# Patient Record
Sex: MALE
[De-identification: names, ages, dates, MRNs, and addresses within clinical notes are randomized per-mention and may not be internally consistent; named-entity substitution may affect disease eponyms.]

---

## 2018-01-28 ENCOUNTER — HOSPITAL ENCOUNTER (EMERGENCY)
Dept: HOSPITAL 25 - UCCORT | Age: 68
Discharge: HOME | End: 2018-01-28
Payer: COMMERCIAL

## 2018-01-28 VITALS — DIASTOLIC BLOOD PRESSURE: 86 MMHG | SYSTOLIC BLOOD PRESSURE: 143 MMHG

## 2018-01-28 DIAGNOSIS — I10: ICD-10-CM

## 2018-01-28 DIAGNOSIS — J32.9: Primary | ICD-10-CM

## 2018-01-28 DIAGNOSIS — Z79.82: ICD-10-CM

## 2018-01-28 PROCEDURE — 99202 OFFICE O/P NEW SF 15 MIN: CPT

## 2018-01-28 PROCEDURE — G0463 HOSPITAL OUTPT CLINIC VISIT: HCPCS

## 2018-01-28 NOTE — UC
Throat Pain/Nasal Jase HPI





- HPI Summary


HPI Summary: 





Right sinus pain. He had uri about 5 days ago with chills and congestion. He 

felt better and then has had right sinus pain. He also had right upper molar 

dental trauma but there is no hot or cold sensitivity there. NO fever. 





- History of Current Complaint


Chief Complaint: UCRespiratory


Stated Complaint: SINUS


Time Seen by Provider: 01/28/18 09:07


Hx Obtained From: Patient


Onset/Duration: Gradual Onset, Lasting Days


Severity: Moderate


Pain Intensity: 5


Cough: None


Associated Signs & Symptoms: Positive: Sinus Discomfort.  Negative: Dysphagia, 

FB Sensation, Fever, Vomiting, Rash





- Allergies/Home Medications


Allergies/Adverse Reactions: 


 Allergies











Allergy/AdvReac Type Severity Reaction Status Date / Time


 


No Known Allergies Allergy   Verified 01/28/18 09:08











Home Medications: 


 Home Medications





Acetaminophen [Acetaminophen Extra Stren] 1,000 mg PO Q6H PRN 01/28/18 [History 

Confirmed 01/28/18]


Aspirin [Aspirin 81 MG TAB] 81 mg PO QPM 01/28/18 [History Confirmed 01/28/18]


Atorvastatin* [Lipitor*] 20 mg PO QPM 01/28/18 [History Confirmed 01/28/18]


Cholecalciferol [Vitamin D] 1,000 unit PO DAILY 01/28/18 [History Confirmed 01/ 28/18]


Irbesartan [Avapro] 150 mg PO QPM 01/28/18 [History Confirmed 01/28/18]


Multivitamins/Minerals TAB* [Theragran/minerals TAB*] 1 tab PO DAILY 01/28/18 [

History Confirmed 01/28/18]


Omega-3 Fatty Acids [Fish Oil] 2,400 mg PO DAILY 01/28/18 [History Confirmed 01/ 28/18]


Phenylephrine HCl (Oral) [Sudafed PE Congestion] 10 mg PO Q6H PRN 01/28/18 [

History Confirmed 01/28/18]











PMH/Surg Hx/FS Hx/Imm Hx


Previously Healthy: No - htn 





- Surgical History


Surgical History: None


Surgery Procedure, Year, and Place: molars; left facial fxs 1970; abdominal 

hernia with mesh within 10 yrs;





- Family History


Known Family History: Positive: Other - htn





- Social History


Alcohol Use: Occasionally


Substance Use Type: None


Smoking Status (MU): Never Smoked Tobacco





Review of Systems


ENT: Sinus Congestion, Sinus Pain/Tenderness


All Other Systems Reviewed And Are Negative: Yes





Physical Exam


Triage Information Reviewed: Yes


Appearance: Well-Appearing, No Pain Distress, Well-Nourished


Vital Signs: 


 Initial Vital Signs











Temp  98.3 F   01/28/18 09:14


 


Pulse  80   01/28/18 09:14


 


Resp  18   01/28/18 09:14


 


BP  143/86   01/28/18 09:14


 


Pulse Ox  99   01/28/18 09:14











Vital Signs Reviewed: Yes


Eyes: Positive: Conjunctiva Clear


ENT: Positive: Normal ENT inspection, Pharynx normal, TMs normal, Sinus 

tenderness.  Negative: Tonsillar swelling, Tonsillar exudate, Trismus, Muffled 

voice, Hoarse voice, Dental tenderness, Uvula midline


Neck: Positive: Supple, Nontender, No Lymphadenopathy


Respiratory: Positive: Chest non-tender, Lungs clear, Normal breath sounds, No 

respiratory distress, No accessory muscle use.  Negative: Respiratory distress, 

Decreased breath sounds, Accessory muscle use, Crackles, Rhonchi, Stridor


Cardiovascular: Positive: RRR, No Murmur, Pulses Normal


Abdomen Description: Positive: Nontender, No Organomegaly, Soft.  Negative: 

Distended, Guarding


Musculoskeletal: Positive: Strength Intact, ROM Intact.  Negative: No Edema


Neurological: Positive: Alert, Muscle Tone Normal.  Negative: Fatigued


Psychological: Positive: Normal Response To Family, Age Appropriate Behavior


Skin: Negative: rashes





Throat Pain/Nasal Course/Dx





- Course


Course Of Treatment: dental exam is benign.





- Differential Dx/Diagnosis


Provider Diagnoses: right sinusitis.





Discharge





- Discharge Plan


Condition: Good


Disposition: HOME


Prescriptions: 


Amoxicillin PO (*) [Amoxicillin 500 MG CAP*] 500 mg PO TID #30 cap


Patient Education Materials:  Sinusitis (ED)


Referrals: 


Junior Estrada MD [Primary Care Provider] -